# Patient Record
Sex: MALE | Race: WHITE | NOT HISPANIC OR LATINO | Employment: FULL TIME | URBAN - METROPOLITAN AREA
[De-identification: names, ages, dates, MRNs, and addresses within clinical notes are randomized per-mention and may not be internally consistent; named-entity substitution may affect disease eponyms.]

---

## 2017-06-06 ENCOUNTER — ALLSCRIPTS OFFICE VISIT (OUTPATIENT)
Dept: OTHER | Facility: OTHER | Age: 32
End: 2017-06-06

## 2018-01-12 VITALS — DIASTOLIC BLOOD PRESSURE: 70 MMHG | TEMPERATURE: 98.1 F | WEIGHT: 157 LBS | SYSTOLIC BLOOD PRESSURE: 110 MMHG

## 2018-06-07 ENCOUNTER — OFFICE VISIT (OUTPATIENT)
Dept: FAMILY MEDICINE CLINIC | Facility: HOSPITAL | Age: 33
End: 2018-06-07
Payer: COMMERCIAL

## 2018-06-07 DIAGNOSIS — Z88.9 MULTIPLE ALLERGIES: Primary | ICD-10-CM

## 2018-06-07 PROBLEM — H66.90 OTITIS MEDIA, ACUTE: Status: ACTIVE | Noted: 2017-06-06

## 2018-06-07 PROBLEM — H61.23 IMPACTED CERUMEN OF BOTH EARS: Status: ACTIVE | Noted: 2017-06-06

## 2018-06-07 PROCEDURE — 99213 OFFICE O/P EST LOW 20 MIN: CPT | Performed by: PHYSICIAN ASSISTANT

## 2018-06-07 RX ORDER — FLUTICASONE PROPIONATE 50 MCG
2 SPRAY, SUSPENSION (ML) NASAL DAILY
Qty: 16 G | Refills: 3 | Status: SHIPPED | OUTPATIENT
Start: 2018-06-07 | End: 2020-09-30 | Stop reason: ALTCHOICE

## 2018-06-07 NOTE — PATIENT INSTRUCTIONS
Patient to start Zyrtec daily for 4 weeks, and Fluticasone NS for 2 weeks  Recommend otc Debrox regularly  RTO in 2 weeks for ear flushing    Recommend complete, routine blood test

## 2018-06-07 NOTE — PROGRESS NOTES
Assessment/Plan:         Diagnoses and all orders for this visit:    Multiple allergies  -     fluticasone (FLONASE) 50 mcg/act nasal spray; 2 sprays into each nostril daily Use for 2 weeks straight, then prn  Avoid septum  Impacted cerumen  Subjective:      Patient ID: Chantel Matson is a 34 y o  male  34year old white male presents with c/o something in throat, swelling? Lump/pressure  In throat;   past 2-3 months  Notices sx  After drinking coffee or eating certain foods  Admits to binge drinking, 4-6 alcoholic (beers 5%),   drinks on Saturdays, usually, sometimes shots  Denies smoking or using drugs  Denies taking any medications, or supplements  Admits to drinking 2-3 cups of caffeine daily  Admits to being anxious to be in hospital/doctor's office  Denies having GERD sx  No hx  Of smocking, or used drugs  Mother used to smoke, until patient was 17, most of friends smoke  Review of Systems   Constitutional: Negative for chills, diaphoresis, fatigue and fever  HENT: Negative for sore throat and trouble swallowing  Respiratory: Negative for cough, choking and shortness of breath  Gastrointestinal: Negative for abdominal pain, constipation, diarrhea, nausea and vomiting  Neurological: Negative for dizziness, light-headedness, numbness and headaches  Objective: There were no vitals taken for this visit  Physical Exam   Constitutional: He is oriented to person, place, and time  He appears well-developed and well-nourished  No distress  HENT:   Head: Normocephalic and atraumatic  Mouth/Throat: Oropharynx is clear and moist  No oropharyngeal exudate  Unable to see TM due to cerumen, and Turbinates inflamed, left side kissing  Eyes: Conjunctivae and EOM are normal  Right eye exhibits no discharge  Left eye exhibits no discharge  No scleral icterus  Cardiovascular: Normal rate, regular rhythm and normal heart sounds      Pulmonary/Chest: Effort normal and breath sounds normal  No respiratory distress  He has no wheezes  He has no rales  Musculoskeletal: Normal range of motion  He exhibits no edema  Neurological: He is alert and oriented to person, place, and time  Skin: He is not diaphoretic  Nursing note and vitals reviewed

## 2018-06-20 ENCOUNTER — OFFICE VISIT (OUTPATIENT)
Dept: FAMILY MEDICINE CLINIC | Facility: HOSPITAL | Age: 33
End: 2018-06-20
Payer: COMMERCIAL

## 2018-06-20 VITALS
BODY MASS INDEX: 23.7 KG/M2 | WEIGHT: 160 LBS | OXYGEN SATURATION: 98 % | SYSTOLIC BLOOD PRESSURE: 124 MMHG | HEART RATE: 92 BPM | HEIGHT: 69 IN | DIASTOLIC BLOOD PRESSURE: 80 MMHG

## 2018-06-20 DIAGNOSIS — Z88.9 MULTIPLE ALLERGIES: ICD-10-CM

## 2018-06-20 DIAGNOSIS — H61.23 IMPACTED CERUMEN OF BOTH EARS: Primary | ICD-10-CM

## 2018-06-20 PROCEDURE — 3008F BODY MASS INDEX DOCD: CPT | Performed by: PHYSICIAN ASSISTANT

## 2018-06-20 PROCEDURE — 69209 REMOVE IMPACTED EAR WAX UNI: CPT | Performed by: PHYSICIAN ASSISTANT

## 2018-06-20 PROCEDURE — 1036F TOBACCO NON-USER: CPT | Performed by: PHYSICIAN ASSISTANT

## 2018-06-20 PROCEDURE — 99213 OFFICE O/P EST LOW 20 MIN: CPT | Performed by: PHYSICIAN ASSISTANT

## 2018-11-13 NOTE — PROGRESS NOTES
Assessment/Plan:         Diagnoses and all orders for this visit:    Impacted cerumen of both ears  -     Ear cerumen removal; Future    Multiple allergies        Subjective:      Patient ID: Ami Speaker is a 34 y o  male  34year old white male presents for follow up and to have ears flushed  Did not get Fluticasone NS, pharmacy gave patient Claritin D instead? ??    Patient doing well, has been using debrox otc for past few weeks, daily  No ear pain, and no hearing loss  Review of Systems   Constitutional: Negative for chills, diaphoresis, fatigue and fever  HENT: Negative for congestion, ear pain, rhinorrhea and sore throat  Respiratory: Negative for cough and shortness of breath  Objective:      /80   Pulse 92   Ht 5' 9" (1 753 m)   Wt 72 6 kg (160 lb)   SpO2 98%   BMI 23 63 kg/m²          Physical Exam   Constitutional: He is oriented to person, place, and time  He appears well-developed and well-nourished  No distress  HENT:   Head: Normocephalic and atraumatic  Unable to see TM due to cerumen, were viewed after ear flushing in the office, and appear intact/clear  Eyes: Conjunctivae and EOM are normal  Left eye exhibits no discharge  No scleral icterus  Pulmonary/Chest: Effort normal and breath sounds normal    Neurological: He is alert and oriented to person, place, and time  Skin: He is not diaphoretic  Nursing note and vitals reviewed 
No

## 2018-12-20 ENCOUNTER — TELEPHONE (OUTPATIENT)
Dept: FAMILY MEDICINE CLINIC | Facility: HOSPITAL | Age: 33
End: 2018-12-20

## 2018-12-21 DIAGNOSIS — Z20.2 EXPOSURE TO STD: Primary | ICD-10-CM

## 2019-01-10 LAB
HAV IGM SERPL QL IA: NEGATIVE
HBV CORE IGM SERPL QL IA: NEGATIVE
HBV SURFACE AG SERPL QL IA: NEGATIVE
HCV AB S/CO SERPL IA: <0.1 S/CO RATIO (ref 0–0.9)
HGB FRACT BLD-IMP: NEGATIVE
HIV1 AB SERPL QL IA: NEGATIVE
RPR SER QL: NON REACTIVE
SL AMB FINAL INTERPRETATION: NORMAL
SL AMB HIV 1 RNA QUALITATIVE: NEGATIVE
SL AMB HIV 2 AB: NEGATIVE

## 2019-01-14 ENCOUNTER — TELEPHONE (OUTPATIENT)
Dept: FAMILY MEDICINE CLINIC | Facility: HOSPITAL | Age: 34
End: 2019-01-14

## 2019-02-01 ENCOUNTER — TELEPHONE (OUTPATIENT)
Dept: FAMILY MEDICINE CLINIC | Facility: HOSPITAL | Age: 34
End: 2019-02-01

## 2019-02-01 NOTE — TELEPHONE ENCOUNTER
Pt called  Received bill from [facility] for two services on 1/22/19  Inv [id number]  He said his ins called us and told us what tests to order and that we ordered the wrong tests  Told pt I would speak with Brian Murrieta and call Quinton Almeidaemiah and his ins and then call him back when I had all the information and hopefully have this resolved

## 2019-02-05 NOTE — TELEPHONE ENCOUNTER
Pt called back shortly after our conversation and said to forget about the bill  He was just going to pay it

## 2019-04-16 ENCOUNTER — APPOINTMENT (OUTPATIENT)
Dept: URGENT CARE | Facility: CLINIC | Age: 34
End: 2019-04-16
Payer: OTHER MISCELLANEOUS

## 2019-04-16 PROCEDURE — G0382 LEV 3 HOSP TYPE B ED VISIT: HCPCS

## 2019-04-16 PROCEDURE — 99283 EMERGENCY DEPT VISIT LOW MDM: CPT

## 2020-09-30 ENCOUNTER — OFFICE VISIT (OUTPATIENT)
Dept: FAMILY MEDICINE CLINIC | Facility: HOSPITAL | Age: 35
End: 2020-09-30

## 2020-09-30 VITALS
DIASTOLIC BLOOD PRESSURE: 78 MMHG | TEMPERATURE: 98.7 F | HEIGHT: 67 IN | SYSTOLIC BLOOD PRESSURE: 140 MMHG | RESPIRATION RATE: 16 BRPM | WEIGHT: 157 LBS | BODY MASS INDEX: 24.64 KG/M2 | HEART RATE: 84 BPM

## 2020-09-30 DIAGNOSIS — R23.8 PAPULE OF SKIN: Primary | ICD-10-CM

## 2020-09-30 PROCEDURE — 99212 OFFICE O/P EST SF 10 MIN: CPT | Performed by: INTERNAL MEDICINE

## 2020-09-30 NOTE — PROGRESS NOTES
Assessment/Plan:    No problem-specific Assessment & Plan notes found for this encounter  Diagnoses and all orders for this visit:    Papule of skin          Suspect healing folliculitis  Doubt skin cancer, papilloma, herpes genitalis  Has patient to call if skin lesion enlarges, ulcer rates, bleeds or starts draining  Subjective:      Patient ID: Anson Harmon is a 37 y o  male  Patient noticed a palpable of the skin of the groin yesterday when he was taking a shower  Papule has not been draining, ulcerating  He denies any fevers, chills, night sweats, penile discharge, penile ulcers, dysuria, hematuria  The following portions of the patient's history were reviewed and updated as appropriate: current medications, past family history, past medical history, past social history, past surgical history and problem list     Review of Systems   Constitutional: Negative for chills and fever  Genitourinary: Negative for dysuria, hematuria, scrotal swelling, testicular pain and urgency  Skin: Positive for rash  Objective:    /78   Pulse 84   Temp 98 7 °F (37 1 °C) (Tympanic)   Resp 16   Ht 5' 7" (1 702 m)   Wt 71 2 kg (157 lb)   BMI 24 59 kg/m²      Physical Exam  HENT:      Head: Normocephalic  Genitourinary:     Penis: Normal        Scrotum/Testes: Normal       Comments: Patient is circumcised, there was no penile discharge, there were no penile ulcers or growths, there were no testicular lumps or masses, I felt no scrotal masses  Patient had no inguinal lymphadenopathy  Skin:     General: Skin is warm and dry  Comments: Slightly erythematous 2 papules close together were seen on the skin of the groin  Without ulcers, without drainage, without warmth or edema  Neurological:      Mental Status: He is alert and oriented to person, place, and time               King Duncan MD

## 2020-09-30 NOTE — PATIENT INSTRUCTIONS
Call if the lumps on your skin enlarge, ulcers develop on the surface, the lump started draining or bleeding!

## 2020-11-10 ENCOUNTER — OFFICE VISIT (OUTPATIENT)
Dept: FAMILY MEDICINE CLINIC | Facility: HOSPITAL | Age: 35
End: 2020-11-10

## 2020-11-10 VITALS
TEMPERATURE: 97.2 F | WEIGHT: 149 LBS | HEART RATE: 96 BPM | BODY MASS INDEX: 23.39 KG/M2 | DIASTOLIC BLOOD PRESSURE: 72 MMHG | HEIGHT: 67 IN | OXYGEN SATURATION: 97 % | SYSTOLIC BLOOD PRESSURE: 142 MMHG

## 2020-11-10 DIAGNOSIS — K59.09 OTHER CONSTIPATION: Primary | ICD-10-CM

## 2020-11-10 PROCEDURE — 99212 OFFICE O/P EST SF 10 MIN: CPT | Performed by: INTERNAL MEDICINE

## 2020-11-25 ENCOUNTER — TELEPHONE (OUTPATIENT)
Dept: FAMILY MEDICINE CLINIC | Facility: HOSPITAL | Age: 35
End: 2020-11-25

## 2020-11-27 DIAGNOSIS — R53.83 FATIGUE, UNSPECIFIED TYPE: ICD-10-CM

## 2020-11-27 DIAGNOSIS — K59.09 OTHER CONSTIPATION: Primary | ICD-10-CM

## 2021-02-12 ENCOUNTER — TELEPHONE (OUTPATIENT)
Dept: FAMILY MEDICINE CLINIC | Facility: HOSPITAL | Age: 36
End: 2021-02-12

## 2021-02-12 DIAGNOSIS — R19.4 CHANGE IN BOWEL HABITS: Primary | ICD-10-CM

## 2021-02-19 DIAGNOSIS — K59.09 OTHER CONSTIPATION: Primary | ICD-10-CM

## 2021-02-19 DIAGNOSIS — K62.5 RECTAL BLEEDING: ICD-10-CM

## 2021-02-22 ENCOUNTER — OFFICE VISIT (OUTPATIENT)
Dept: FAMILY MEDICINE CLINIC | Facility: HOSPITAL | Age: 36
End: 2021-02-22
Payer: COMMERCIAL

## 2021-02-22 VITALS
WEIGHT: 152 LBS | BODY MASS INDEX: 23.86 KG/M2 | HEIGHT: 67 IN | SYSTOLIC BLOOD PRESSURE: 120 MMHG | RESPIRATION RATE: 16 BRPM | HEART RATE: 69 BPM | DIASTOLIC BLOOD PRESSURE: 82 MMHG

## 2021-02-22 DIAGNOSIS — R19.4 CHANGE IN BOWEL HABITS: ICD-10-CM

## 2021-02-22 DIAGNOSIS — R53.83 FATIGUE, UNSPECIFIED TYPE: ICD-10-CM

## 2021-02-22 DIAGNOSIS — Z00.00 HEALTH CARE MAINTENANCE: Primary | ICD-10-CM

## 2021-02-22 DIAGNOSIS — K59.09 OTHER CONSTIPATION: ICD-10-CM

## 2021-02-22 PROCEDURE — 99395 PREV VISIT EST AGE 18-39: CPT | Performed by: PHYSICIAN ASSISTANT

## 2021-02-22 PROCEDURE — 1036F TOBACCO NON-USER: CPT | Performed by: PHYSICIAN ASSISTANT

## 2021-02-22 NOTE — PROGRESS NOTES
Assessment/Plan:    Health care maintenance       Problem List Items Addressed This Visit        Other    Other constipation - Primary    Relevant Orders    Comprehensive metabolic panel    CBC and differential    Lipid panel    TSH, 3rd generation with Free T4 reflex      Other Visit Diagnoses     Change in bowel habits        Relevant Orders    Comprehensive metabolic panel    Lipid panel    TSH, 3rd generation with Free T4 reflex    Fatigue, unspecified type        Relevant Orders    Comprehensive metabolic panel    CBC and differential    Lipid panel    TSH, 3rd generation with Free T4 reflex    Health care maintenance        Relevant Orders    Comprehensive metabolic panel    CBC and differential    Lipid panel    TSH, 3rd generation with Free T4 reflex            Subjective:      Patient ID: Dinesh Nguyen is a 31 y o  male  Presents for physical      Noticed constipation, and gas build up last year, October, which lasted few weeks  Then noticed a change in bowel habits, sometimes has 3-4 bowel movements a day, but no diarrhea, stool is mostly soft  Use to go once every day or 2  Plans to see GI  Review of Systems   Constitutional: Negative for appetite change, chills, diaphoresis, fatigue and fever  HENT: Negative for congestion, ear pain, rhinorrhea and sore throat  Eyes: Negative for pain, discharge and visual disturbance  Wears glasses-  Last exam- 3-4 years ago, about 2016  Respiratory: Negative for cough, chest tightness and shortness of breath  Gastrointestinal: Negative for abdominal pain, anal bleeding, blood in stool, constipation, diarrhea, nausea and vomiting  Endocrine: Negative for polydipsia, polyphagia and polyuria  Genitourinary: Negative for discharge, dysuria, frequency, penile pain, penile swelling, scrotal swelling and testicular pain  Musculoskeletal: Negative for arthralgias, back pain, myalgias, neck pain and neck stiffness     Neurological: Negative for dizziness, tremors, weakness, light-headedness, numbness and headaches  Psychiatric/Behavioral: Negative for dysphoric mood, self-injury, sleep disturbance and suicidal ideas  The patient is not nervous/anxious  Objective:      /82   Pulse 69   Resp 16   Ht 5' 7" (1 702 m)   Wt 68 9 kg (152 lb)   BMI 23 81 kg/m²          Physical Exam  Vitals signs and nursing note reviewed  Constitutional:       General: He is not in acute distress  Appearance: Normal appearance  He is not ill-appearing, toxic-appearing or diaphoretic  HENT:      Head: Normocephalic and atraumatic  Right Ear: Tympanic membrane, ear canal and external ear normal       Left Ear: There is impacted cerumen  Nose: Nose normal  No congestion or rhinorrhea  Eyes:      General: No scleral icterus  Right eye: No discharge  Left eye: No discharge  Extraocular Movements: Extraocular movements intact  Conjunctiva/sclera: Conjunctivae normal    Neck:      Musculoskeletal: Neck supple  Cardiovascular:      Rate and Rhythm: Normal rate and regular rhythm  Pulses: Normal pulses  Heart sounds: Normal heart sounds  No murmur  No friction rub  No gallop  Pulmonary:      Effort: Pulmonary effort is normal  No respiratory distress  Breath sounds: Normal breath sounds  No stridor  No wheezing, rhonchi or rales  Chest:      Chest wall: No tenderness  Abdominal:      General: Abdomen is flat  There is no distension  Palpations: There is no mass  Tenderness: There is no abdominal tenderness  There is no right CVA tenderness, left CVA tenderness, guarding or rebound  Hernia: No hernia is present  Genitourinary:     Penis: Normal        Scrotum/Testes: Normal    Musculoskeletal: Normal range of motion  General: No swelling, tenderness, deformity or signs of injury  Right lower leg: No edema  Left lower leg: No edema     Skin:     General: Skin is warm and dry  Neurological:      General: No focal deficit present  Mental Status: He is alert and oriented to person, place, and time  Cranial Nerves: No cranial nerve deficit  Sensory: No sensory deficit  Motor: No weakness  Coordination: Coordination normal       Gait: Gait normal    Psychiatric:         Mood and Affect: Mood normal          Thought Content: Thought content normal          Judgment: Judgment normal       Comments: Appears somewhat on edge, somewhat anxious

## 2021-02-22 NOTE — PATIENT INSTRUCTIONS
Urged patient to get complete, fasting blood test    Follow up with GI about change in bowel habits  Discussed meal planning, recommend plenty of water and fiber in diet

## 2021-02-23 ENCOUNTER — OFFICE VISIT (OUTPATIENT)
Dept: GASTROENTEROLOGY | Facility: CLINIC | Age: 36
End: 2021-02-23
Payer: COMMERCIAL

## 2021-02-23 VITALS
SYSTOLIC BLOOD PRESSURE: 122 MMHG | WEIGHT: 153 LBS | BODY MASS INDEX: 24.01 KG/M2 | HEART RATE: 69 BPM | DIASTOLIC BLOOD PRESSURE: 80 MMHG | HEIGHT: 67 IN

## 2021-02-23 DIAGNOSIS — R19.4 CHANGE IN BOWEL HABITS: Primary | ICD-10-CM

## 2021-02-23 PROCEDURE — 99203 OFFICE O/P NEW LOW 30 MIN: CPT | Performed by: INTERNAL MEDICINE

## 2021-02-23 PROCEDURE — 3008F BODY MASS INDEX DOCD: CPT | Performed by: PHYSICIAN ASSISTANT

## 2021-02-23 NOTE — PROGRESS NOTES
[facility] - Outpatient Consultation  Brittany Cowart 31 y o  male MRN: [id number]  Encounter: [id number]          ASSESSMENT AND PLAN:      1  Change in bowel habits    Probably some component of irritable bowel syndrome  Probably related to his return to the work force with a change in diet or change in stress level  Seems to be returning back to his baseline of 1 bowel movement every other day  High-fiber diet seem to make it worse  -   Start fiber supplementation daily with either fiber pills or gummy fibers  -  Get blood work and stool guaiac ordered by PCP done  -   Would not put him through an extensive evaluation (colonoscopy) a less some abnormality in the blood work or stool test  -  Follow-up office visit 4 months    ______________________________________________________________________    HPI:   The patient is seen in the office today per the request of her PCP for evaluation of a change in bowel habits  He reports that his usual bowel movement pattern is about 1 is every other day  In the fall he began having multiple loose stools  He reports initially having 3 soft bowel movements daily  He reports passing some mucus as well  He denies any rectal bleeding  He denies any abdominal pain  He denies any upper GI symptoms  He denies any change in his diet or medications  He reports that he started  Working again around this time  He denies any increase in his stress  Over the last several months his bowels are slowly returning to normal   He is moving his bowels about once a day now  His weight overall has been stable  He saw his PCP who ordered blood work and guaiac stool test and told him he should see GI for evaluation      REVIEW OF SYSTEMS:    CONSTITUTIONAL: Denies any fever, chills, rigors, and weight loss  HEENT: No earache or tinnitus  Denies hearing loss or visual disturbances  CARDIOVASCULAR: No chest pain or palpitations     RESPIRATORY: Denies any cough, hemoptysis, shortness of breath or dyspnea on exertion  GASTROINTESTINAL: As noted in the History of Present Illness  GENITOURINARY: No problems with urination  Denies any hematuria or dysuria  NEUROLOGIC: No dizziness or vertigo, denies headaches  MUSCULOSKELETAL: Denies any muscle or joint pain  SKIN: Denies skin rashes or itching  ENDOCRINE: Denies excessive thirst  Denies intolerance to heat or cold  PSYCHOSOCIAL: Denies depression or anxiety  Denies any recent memory loss  Historical Information   Past Medical History:   Diagnosis Date    Allergic     Allergic rhinitis      History reviewed  No pertinent surgical history  Social History   Social History     Substance and Sexual Activity   Alcohol Use Yes    Comment: occasionally     Social History     Substance and Sexual Activity   Drug Use No     Social History     Tobacco Use   Smoking Status Never Smoker   Smokeless Tobacco Never Used     Family History   Problem Relation Age of Onset    Diabetes Mother     Thyroid disease Mother     Allergies Mother     Substance Abuse Neg Hx     Mental illness Neg Hx     Colon cancer Neg Hx     Colon polyps Neg Hx        Meds/Allergies     No current outpatient medications on file  Allergies   Allergen Reactions    Nickel            Objective     Blood pressure 122/80, pulse 69, height 5' 7" (1 702 m), weight 69 4 kg (153 lb)  Body mass index is 23 96 kg/m²  PHYSICAL EXAM:      General Appearance:   Alert, cooperative, no distress   HEENT:   Normocephalic, atraumatic, anicteric      Neck:  Supple, symmetrical, trachea midline   Lungs:   Clear to auscultation bilaterally; no rales, rhonchi or wheezing; respirations unlabored    Heart[de-identified]   Regular rate and rhythm; no murmur, rub, or gallop     Abdomen:   Soft, non-tender, non-distended; normal bowel sounds; no masses, no organomegaly    Genitalia:   Deferred    Rectal:   Deferred    Extremities:  No cyanosis, clubbing or edema  Pulses:  2+ and symmetric    Skin:  No jaundice, rashes, or lesions    Lymph nodes:  No palpable cervical lymphadenopathy        Lab Results:   No visits with results within 1 Day(s) from this visit  Latest known visit with results is:   Orders Only on 12/21/2018   Component Date Value    HIV-1 Antibody 01/07/2019 Negative     HIV 2 Ab 01/07/2019 Negative     Interpretation 01/07/2019 Negative     RPR 01/07/2019 Non Reactive     Hep A Ab, IgM 01/07/2019 Negative     HBsAg Screen 01/07/2019 Negative     Hep B Core Ab, IgM 01/07/2019 Negative     HEP C AB 01/07/2019 <0 1     HIV 1 RNA Qualitative 01/07/2019 Negative     Final Interpretation 01/07/2019 Comment          Radiology Results:   No results found

## 2021-02-23 NOTE — LETTER
February 23, 2021     EULA Silvestre  [address] [address]    Patient: Danyelle Mcrae   YOB: 1985   Date of Visit: 2/23/2021       Dear Dr Brisa Fagan: Thank you for referring Danyelle Mcrae to me for evaluation  Below are my notes for this consultation  If you have questions, please do not hesitate to call me  I look forward to following your patient along with you  Sincerely,        Jcarlos Bruno MD        CC: No Recipients  Jcarlos Bruno MD  2/23/2021  5:06 PM  Sign when Signing Visit  [facility] - Outpatient Consultation  Danyelle Mcrae 31 y o  male MRN: [id number]  Encounter: [id number]          ASSESSMENT AND PLAN:      1  Change in bowel habits    Probably some component of irritable bowel syndrome  Probably related to his return to the work force with a change in diet or change in stress level  Seems to be returning back to his baseline of 1 bowel movement every other day  High-fiber diet seem to make it worse  -   Start fiber supplementation daily with either fiber pills or gummy fibers  -  Get blood work and stool guaiac ordered by PCP done  -   Would not put him through an extensive evaluation (colonoscopy) a less some abnormality in the blood work or stool test  -  Follow-up office visit 4 months    ______________________________________________________________________    HPI:   The patient is seen in the office today per the request of her PCP for evaluation of a change in bowel habits  He reports that his usual bowel movement pattern is about 1 is every other day  In the fall he began having multiple loose stools  He reports initially having 3 soft bowel movements daily  He reports passing some mucus as well  He denies any rectal bleeding  He denies any abdominal pain  He denies any upper GI symptoms  He denies any change in his diet or medications    He reports that he started  Working again around this time  He denies any increase in his stress  Over the last several months his bowels are slowly returning to normal   He is moving his bowels about once a day now  His weight overall has been stable  He saw his PCP who ordered blood work and guaiac stool test and told him he should see GI for evaluation      REVIEW OF SYSTEMS:    CONSTITUTIONAL: Denies any fever, chills, rigors, and weight loss  HEENT: No earache or tinnitus  Denies hearing loss or visual disturbances  CARDIOVASCULAR: No chest pain or palpitations  RESPIRATORY: Denies any cough, hemoptysis, shortness of breath or dyspnea on exertion  GASTROINTESTINAL: As noted in the History of Present Illness  GENITOURINARY: No problems with urination  Denies any hematuria or dysuria  NEUROLOGIC: No dizziness or vertigo, denies headaches  MUSCULOSKELETAL: Denies any muscle or joint pain  SKIN: Denies skin rashes or itching  ENDOCRINE: Denies excessive thirst  Denies intolerance to heat or cold  PSYCHOSOCIAL: Denies depression or anxiety  Denies any recent memory loss  Historical Information   Past Medical History:   Diagnosis Date    Allergic     Allergic rhinitis      History reviewed  No pertinent surgical history  Social History   Social History     Substance and Sexual Activity   Alcohol Use Yes    Comment: occasionally     Social History     Substance and Sexual Activity   Drug Use No     Social History     Tobacco Use   Smoking Status Never Smoker   Smokeless Tobacco Never Used     Family History   Problem Relation Age of Onset    Diabetes Mother     Thyroid disease Mother     Allergies Mother     Substance Abuse Neg Hx     Mental illness Neg Hx     Colon cancer Neg Hx     Colon polyps Neg Hx        Meds/Allergies     No current outpatient medications on file  Allergies   Allergen Reactions    Nickel            Objective     Blood pressure 122/80, pulse 69, height 5' 7" (1 702 m), weight 69 4 kg (153 lb)  Body mass index is 23 96 kg/m²  PHYSICAL EXAM:      General Appearance:   Alert, cooperative, no distress   HEENT:   Normocephalic, atraumatic, anicteric      Neck:  Supple, symmetrical, trachea midline   Lungs:   Clear to auscultation bilaterally; no rales, rhonchi or wheezing; respirations unlabored    Heart[de-identified]   Regular rate and rhythm; no murmur, rub, or gallop  Abdomen:   Soft, non-tender, non-distended; normal bowel sounds; no masses, no organomegaly    Genitalia:   Deferred    Rectal:   Deferred    Extremities:  No cyanosis, clubbing or edema    Pulses:  2+ and symmetric    Skin:  No jaundice, rashes, or lesions    Lymph nodes:  No palpable cervical lymphadenopathy        Lab Results:   No visits with results within 1 Day(s) from this visit  Latest known visit with results is:   Orders Only on 12/21/2018   Component Date Value    HIV-1 Antibody 01/07/2019 Negative     HIV 2 Ab 01/07/2019 Negative     Interpretation 01/07/2019 Negative     RPR 01/07/2019 Non Reactive     Hep A Ab, IgM 01/07/2019 Negative     HBsAg Screen 01/07/2019 Negative     Hep B Core Ab, IgM 01/07/2019 Negative     HEP C AB 01/07/2019 <0 1     HIV 1 RNA Qualitative 01/07/2019 Negative     Final Interpretation 01/07/2019 Comment          Radiology Results:   No results found

## 2021-02-23 NOTE — PATIENT INSTRUCTIONS
Start fiber supplementation daily  Probably best use fiber pill or gummy fiber    Get blood work and stool guaiac test done

## 2021-03-12 ENCOUNTER — TELEPHONE (OUTPATIENT)
Dept: FAMILY MEDICINE CLINIC | Facility: HOSPITAL | Age: 36
End: 2021-03-12

## 2021-11-01 ENCOUNTER — OFFICE VISIT (OUTPATIENT)
Dept: FAMILY MEDICINE CLINIC | Facility: HOSPITAL | Age: 36
End: 2021-11-01
Payer: COMMERCIAL

## 2021-11-01 VITALS
WEIGHT: 158 LBS | DIASTOLIC BLOOD PRESSURE: 80 MMHG | TEMPERATURE: 99 F | SYSTOLIC BLOOD PRESSURE: 120 MMHG | BODY MASS INDEX: 24.75 KG/M2

## 2021-11-01 DIAGNOSIS — Z88.9 H/O SEASONAL ALLERGIES: Primary | ICD-10-CM

## 2021-11-01 PROCEDURE — 99212 OFFICE O/P EST SF 10 MIN: CPT | Performed by: PHYSICIAN ASSISTANT

## 2021-11-01 RX ORDER — OLOPATADINE HYDROCHLORIDE 1 MG/ML
1 SOLUTION/ DROPS OPHTHALMIC 2 TIMES DAILY
Qty: 5 ML | Refills: 3 | Status: SHIPPED | OUTPATIENT
Start: 2021-11-01

## 2022-03-07 ENCOUNTER — OFFICE VISIT (OUTPATIENT)
Dept: FAMILY MEDICINE CLINIC | Facility: HOSPITAL | Age: 37
End: 2022-03-07
Payer: COMMERCIAL

## 2022-03-07 VITALS
OXYGEN SATURATION: 98 % | HEART RATE: 72 BPM | HEIGHT: 68 IN | DIASTOLIC BLOOD PRESSURE: 76 MMHG | BODY MASS INDEX: 25.28 KG/M2 | WEIGHT: 166.8 LBS | SYSTOLIC BLOOD PRESSURE: 118 MMHG | RESPIRATION RATE: 16 BRPM

## 2022-03-07 DIAGNOSIS — Z00.00 ANNUAL PHYSICAL EXAM: Primary | ICD-10-CM

## 2022-03-07 PROBLEM — K59.09 OTHER CONSTIPATION: Status: RESOLVED | Noted: 2020-11-10 | Resolved: 2022-03-07

## 2022-03-07 PROBLEM — H66.90 OTITIS MEDIA, ACUTE: Status: RESOLVED | Noted: 2017-06-06 | Resolved: 2022-03-07

## 2022-03-07 PROBLEM — J30.1 SEASONAL ALLERGIC RHINITIS DUE TO POLLEN: Status: ACTIVE | Noted: 2022-03-07

## 2022-03-07 PROCEDURE — 99395 PREV VISIT EST AGE 18-39: CPT | Performed by: INTERNAL MEDICINE

## 2022-03-07 NOTE — PATIENT INSTRUCTIONS

## 2022-03-07 NOTE — PROGRESS NOTES
[facility] [address]    NAME: Quinn Easton  AGE: 30 y o  SEX: male  : 1985     DATE: 3/7/2022     Assessment and Plan:     Problem List Items Addressed This Visit     None      Visit Diagnoses     Annual physical exam    -  Primary          Immunizations and preventive care screenings were discussed with patient today  Appropriate education was printed on patient's after visit summary  Counseling:  Exercise: the importance of regular exercise/physical activity was discussed  Recommend exercise 3-5 times per week for at least 30 minutes  · Advised patient to have a fasting blood sugar and cholesterol checked  Patient declined today  Will consider next year      Depression Screening and Follow-up Plan: Patient was screened for depression during today's encounter  They screened negative with a PHQ-2 score of 0  No follow-ups on file  Chief Complaint:     No chief complaint on file  History of Present Illness:     Adult Annual Physical   Patient here for a comprehensive physical exam  The patient reports no problems  Diet and Physical Activity  · Diet/Nutrition: well balanced diet  · Exercise: moderate cardiovascular exercise  Depression Screening  PHQ-2/9 Depression Screening    Little interest or pleasure in doing things: 0 - not at all  Feeling down, depressed, or hopeless: 0 - not at all  PHQ-2 Score: 0  PHQ-2 Interpretation: Negative depression screen       General Health  · Sleep: sleeps well  · Hearing: normal - bilateral   · Vision: wears glasses  · Dental: regular dental visits  [facility]  · History of STDs?: no      Review of Systems:     Review of Systems   Constitutional: Negative for fever  HENT: Negative for congestion  Eyes: Negative for visual disturbance  Respiratory: Negative for cough and shortness of breath      Cardiovascular: Negative for chest pain, palpitations and leg swelling  Gastrointestinal: Negative for abdominal pain, blood in stool and diarrhea  Endocrine: Negative for polydipsia  Genitourinary: Negative for difficulty urinating and hematuria  Musculoskeletal: Negative for joint swelling, myalgias and neck stiffness  Skin: Negative for rash  Neurological: Negative for weakness, numbness and headaches  Hematological: Negative for adenopathy  Psychiatric/Behavioral: Negative for dysphoric mood  All other systems reviewed and are negative  Past Medical History:     Past Medical History:   Diagnosis Date    Allergic     Allergic rhinitis       Past Surgical History:     History reviewed  No pertinent surgical history  Social History:     Social History     Socioeconomic History    Marital status: Single     Spouse name: None    Number of children: None    Years of education: None    Highest education level: None   Occupational History    None   Tobacco Use    Smoking status: Never Smoker    Smokeless tobacco: Never Used   Vaping Use    Vaping Use: Never used   Substance and Sexual Activity    Alcohol use: Not Currently    Drug use: No    Sexual activity: None   Other Topics Concern    None   Social History Narrative    Racial background:   White     Wears seatbelt    Exercise habits    Lives with parents    Feels safe at home     Social Determinants of Health     Financial Resource Strain: Not on file   Food Insecurity: Not on file   Transportation Needs: Not on file   Physical Activity: Not on file   Stress: Not on file   Social Connections: Not on file   Intimate Partner Violence: Not on file   Housing Stability: Not on file      Family History:     Family History   Problem Relation Age of Onset    Diabetes Mother     Thyroid disease Mother     Allergies Mother     Substance Abuse Neg Hx     Mental illness Neg Hx     Colon cancer Neg Hx     Colon polyps Neg Hx       Current Medications:     Current Outpatient Medications   Medication Sig Dispense Refill    olopatadine (PATANOL) 0 1 % ophthalmic solution Administer 1 drop to both eyes 2 (two) times a day (Patient not taking: Reported on 3/7/2022 ) 5 mL 3     No current facility-administered medications for this visit  Allergies: Allergies   Allergen Reactions    Nickel       Physical Exam:     /76   Pulse 72   Resp 16   Ht 5' 8" (1 727 m)   Wt 75 7 kg (166 lb 12 8 oz)   SpO2 98%   BMI 25 36 kg/m²     Physical Exam  Constitutional:       Appearance: He is well-developed  HENT:      Head: Normocephalic  Right Ear: There is impacted cerumen  Left Ear: There is impacted cerumen  Eyes:      Conjunctiva/sclera: Conjunctivae normal    Cardiovascular:      Rate and Rhythm: Normal rate and regular rhythm  Heart sounds: No murmur heard  Pulmonary:      Effort: No respiratory distress  Breath sounds: No wheezing or rales  Abdominal:      General: Bowel sounds are normal       Palpations: Abdomen is soft  Tenderness: There is no abdominal tenderness  Musculoskeletal:         General: No tenderness  Cervical back: Neck supple  Lymphadenopathy:      Cervical: No cervical adenopathy  Skin:     General: Skin is warm and dry  Findings: No erythema  Neurological:      Mental Status: He is alert and oriented to person, place, and time  Cranial Nerves: No cranial nerve deficit  Motor: No weakness  Gait: Gait normal    Psychiatric:         Mood and Affect: Mood normal          Thought Content:  Thought content normal          Judgment: Judgment normal           Mally Esparza MD     [facility] [phone]

## 2023-01-30 ENCOUNTER — OFFICE VISIT (OUTPATIENT)
Dept: FAMILY MEDICINE CLINIC | Facility: HOSPITAL | Age: 38
End: 2023-01-30

## 2023-01-30 ENCOUNTER — TELEPHONE (OUTPATIENT)
Dept: FAMILY MEDICINE CLINIC | Facility: HOSPITAL | Age: 38
End: 2023-01-30

## 2023-01-30 VITALS
TEMPERATURE: 96.6 F | BODY MASS INDEX: 24.98 KG/M2 | DIASTOLIC BLOOD PRESSURE: 98 MMHG | WEIGHT: 164.8 LBS | SYSTOLIC BLOOD PRESSURE: 138 MMHG | HEIGHT: 68 IN | HEART RATE: 94 BPM

## 2023-01-30 DIAGNOSIS — J06.9 VIRAL UPPER RESPIRATORY ILLNESS: Primary | ICD-10-CM

## 2023-01-30 NOTE — TELEPHONE ENCOUNTER
Pt c/o severe head congestion, runny nose and very dry thorat  No fever  Symptoms began yesterday  Not currently taking any OTC   PCB

## 2023-01-30 NOTE — PROGRESS NOTES
Name: Fabian Skiff      : 1985      MRN: [id number]  Encounter Provider: Jt Tavarez DO  Encounter Date: 2023   Encounter department: [address] [place] [address]  203     Assessment & Plan     1  Viral upper respiratory illness  Comments:  Reassured pt that his exam was normal and duration of symptoms are within nml for a viral URI, encouraged OTC antihistamine, Flonase and saline nasal spray, encouraged gargles/hot tea/lozenges/rest/fluids, d/w pt that symptoms can persist 10 days - if no better at all after 10 days OR with any significant new/worse symptoms he should give us a call for re-eval           Subjective      HPI Pt here for an acute visit    Pt noting 4 days of worsening congestion  He has chronic allergies but 4 days ago symptoms started to worsen  He notes his chronic congestions is worse and he a lot of PND  He notes no HA's/sinus pain or pressure/ST/F/C/ear pain/N/V/D/swallowing issues/urinary symptoms  He has been using a sinus spray and just started warm saline rinses w/some benefit  He is not on an antihistamine  He notes no sick contacts  He did a COVID test today and it was negative  Review of Systems   Constitutional: Negative for chills and fever  HENT: Positive for postnasal drip and rhinorrhea  Negative for congestion, ear pain, sinus pressure, sinus pain and sore throat  Eyes: Negative for discharge, redness and itching  Respiratory: Negative for cough and shortness of breath  Cardiovascular: Negative for chest pain and palpitations  Musculoskeletal: Negative for arthralgias and myalgias  Skin: Negative for rash and wound  Neurological: Negative for dizziness and headaches         Current Outpatient Medications on File Prior to Visit   Medication Sig   • olopatadine (PATANOL) 0 1 % ophthalmic solution Administer 1 drop to both eyes 2 (two) times a day (Patient not taking: Reported on 3/7/2022 )       Objective     /98   Pulse 94   Temp (!) 96 6 °F (35 9 °C) (Tympanic)   Ht 5' 8" (1 727 m)   Wt 74 8 kg (164 lb 12 8 oz)   BMI 25 06 kg/m²     Physical Exam  Constitutional:       General: He is not in acute distress  Appearance: He is well-developed  He is not ill-appearing  HENT:      Head: Normocephalic and atraumatic  Right Ear: External ear normal       Left Ear: External ear normal       Ears:      Comments: Partial cerumen impaction B/L  Eyes:      General:         Right eye: No discharge  Left eye: No discharge  Conjunctiva/sclera: Conjunctivae normal    Cardiovascular:      Rate and Rhythm: Normal rate and regular rhythm  Heart sounds: No murmur heard  Pulmonary:      Effort: Pulmonary effort is normal  No respiratory distress  Breath sounds: Normal breath sounds  No wheezing, rhonchi or rales  Lymphadenopathy:      Cervical: Cervical adenopathy present  Skin:     General: Skin is warm and dry  Coloration: Skin is not pale  Findings: No rash  Neurological:      General: No focal deficit present  Mental Status: He is alert        Gait: Gait normal    Psychiatric:         Mood and Affect: Mood normal          Behavior: Behavior normal        Venu Shape, DO

## 2023-03-06 ENCOUNTER — OFFICE VISIT (OUTPATIENT)
Dept: FAMILY MEDICINE CLINIC | Facility: HOSPITAL | Age: 38
End: 2023-03-06

## 2023-03-06 VITALS
HEART RATE: 64 BPM | BODY MASS INDEX: 24.25 KG/M2 | SYSTOLIC BLOOD PRESSURE: 132 MMHG | OXYGEN SATURATION: 99 % | RESPIRATION RATE: 16 BRPM | WEIGHT: 160 LBS | HEIGHT: 68 IN | DIASTOLIC BLOOD PRESSURE: 76 MMHG

## 2023-03-06 DIAGNOSIS — Z11.59 NEED FOR HEPATITIS C SCREENING TEST: ICD-10-CM

## 2023-03-06 DIAGNOSIS — Z13.1 SCREENING FOR DIABETES MELLITUS: ICD-10-CM

## 2023-03-06 DIAGNOSIS — Z13.6 SCREENING FOR CARDIOVASCULAR CONDITION: ICD-10-CM

## 2023-03-06 DIAGNOSIS — Z11.4 SCREENING FOR HIV (HUMAN IMMUNODEFICIENCY VIRUS): ICD-10-CM

## 2023-03-06 DIAGNOSIS — Z00.00 ANNUAL PHYSICAL EXAM: Primary | ICD-10-CM

## 2023-03-06 DIAGNOSIS — Z11.3 SCREENING FOR STDS (SEXUALLY TRANSMITTED DISEASES): ICD-10-CM

## 2023-03-06 NOTE — PROGRESS NOTES
[facility] [address]    NAME: Mel Castro  AGE: 41 y o  SEX: male  : 1985     DATE: 3/6/2023     Assessment and Plan:     Problem List Items Addressed This Visit    None  Visit Diagnoses     Annual physical exam    -  Primary    Screening for diabetes mellitus        Relevant Orders    Comprehensive metabolic panel    Screening for cardiovascular condition        Relevant Orders    Lipid panel    Screening for HIV (human immunodeficiency virus)        Relevant Orders    HIV 1/2 AG/AB W REFLEX [facility] and [facility] only    Need for hepatitis C screening test        Relevant Orders    Hepatitis C antibody    Screening for STDs (sexually transmitted diseases)        Relevant Orders    RPR w/reflex to TrepSure          Immunizations and preventive care screenings were discussed with patient today  Appropriate education was printed on patient's after visit summary  Counseling:  · Exercise: the importance of regular exercise/physical activity was discussed  Recommend exercise 3-5 times per week for at least 30 minutes  · Wishes to be screened for STIs         Return in 1 year (on 3/6/2024)  Chief Complaint:     Chief Complaint   Patient presents with   • Annual Exam      History of Present Illness:     Adult Annual Physical   Patient here for a comprehensive physical exam  The patient reports no problems  Diet and Physical Activity  · Diet/Nutrition: well balanced diet  · Exercise: vigorous cardiovascular exercise  Depression Screening  PHQ-2/9 Depression Screening    Little interest or pleasure in doing things: 0 - not at all  Feeling down, depressed, or hopeless: 0 - not at all  PHQ-2 Score: 0  PHQ-2 Interpretation: Negative depression screen       General Health  · Sleep: sleeps well  · Hearing: normal - bilateral   · Vision: wears glasses  · Dental: no dental visits for >1 year          Health  · History of STDs?: no      Review of Systems:     Review of Systems   Constitutional: Negative for fever  HENT: Negative for congestion and hearing loss  Eyes: Negative for visual disturbance  Respiratory: Negative for cough and shortness of breath  Cardiovascular: Negative for chest pain, palpitations and leg swelling  Gastrointestinal: Negative for abdominal pain, blood in stool and diarrhea  Endocrine: Positive for polyphagia  Negative for polydipsia  Genitourinary: Negative for difficulty urinating and hematuria  Musculoskeletal: Negative for myalgias  Skin: Negative for rash  Neurological: Negative for numbness and headaches  Hematological: Negative for adenopathy  Psychiatric/Behavioral: Negative for dysphoric mood  All other systems reviewed and are negative  Past Medical History:     History reviewed  No pertinent past medical history  Past Surgical History:     History reviewed  No pertinent surgical history  Social History:     Social History     Socioeconomic History   • Marital status: Single     Spouse name: None   • Number of children: None   • Years of education: None   • Highest education level: None   Occupational History   • None   Tobacco Use   • Smoking status: Never   • Smokeless tobacco: Never   Vaping Use   • Vaping Use: Never used   Substance and Sexual Activity   • Alcohol use: Not Currently   • Drug use: No   • Sexual activity: None   Other Topics Concern   • None   Social History Narrative    Racial background:   White     Wears seatbelt    Exercise habits    Lives with parents    Feels safe at home     Social Determinants of Health     Financial Resource Strain: Not on file   Food Insecurity: Not on file   Transportation Needs: Not on file   Physical Activity: Not on file   Stress: Not on file   Social Connections: Not on file   Intimate Partner Violence: Not on file   Housing Stability: Not on file      Family History:     Family History   Problem Relation Age of Onset   • Diabetes Mother    • Thyroid disease Mother    • Allergies Mother    • Substance Abuse Neg Hx    • Mental illness Neg Hx    • Colon cancer Neg Hx    • Colon polyps Neg Hx       Current Medications:     Current Outpatient Medications   Medication Sig Dispense Refill   • ARTIFICIAL TEAR SOLUTION OP Apply to eye 1 drop each eye as needed       No current facility-administered medications for this visit  Allergies: Allergies   Allergen Reactions   • Nickel Other (See Comments)     rash      Physical Exam:     /76   Pulse 64   Resp 16   Ht 5' 8" (1 727 m)   Wt 72 6 kg (160 lb)   SpO2 99%   BMI 24 33 kg/m²     Physical Exam  Constitutional:       Appearance: He is well-developed  HENT:      Head: Normocephalic  Right Ear: There is impacted cerumen  Left Ear: There is impacted cerumen  Mouth/Throat:      Mouth: Mucous membranes are moist       Pharynx: No oropharyngeal exudate  Eyes:      Conjunctiva/sclera: Conjunctivae normal    Cardiovascular:      Rate and Rhythm: Normal rate and regular rhythm  Heart sounds: No murmur heard  Pulmonary:      Effort: No respiratory distress  Breath sounds: No wheezing or rales  Abdominal:      General: Bowel sounds are normal       Palpations: Abdomen is soft  Tenderness: There is no abdominal tenderness  Musculoskeletal:         General: No tenderness  Cervical back: Neck supple  Skin:     General: Skin is warm and dry  Findings: No erythema  Neurological:      Mental Status: He is alert and oriented to person, place, and time  Cranial Nerves: No cranial nerve deficit  Motor: No weakness  Gait: Gait normal    Psychiatric:         Mood and Affect: Mood normal          Thought Content:  Thought content normal           Angelica Mattson MD   [facility] [id number]

## 2023-09-15 ENCOUNTER — OFFICE VISIT (OUTPATIENT)
Dept: URGENT CARE | Facility: CLINIC | Age: 38
End: 2023-09-15
Payer: COMMERCIAL

## 2023-09-15 VITALS
HEART RATE: 70 BPM | OXYGEN SATURATION: 99 % | SYSTOLIC BLOOD PRESSURE: 147 MMHG | TEMPERATURE: 98.3 F | RESPIRATION RATE: 16 BRPM | DIASTOLIC BLOOD PRESSURE: 88 MMHG

## 2023-09-15 DIAGNOSIS — S05.52XA FOREIGN BODY IN LENS OF LEFT EYE, INITIAL ENCOUNTER: Primary | ICD-10-CM

## 2023-09-15 PROCEDURE — 99213 OFFICE O/P EST LOW 20 MIN: CPT | Performed by: FAMILY MEDICINE

## 2023-09-15 RX ORDER — OFLOXACIN 3 MG/ML
1 SOLUTION/ DROPS OPHTHALMIC 4 TIMES DAILY
Qty: 5 ML | Refills: 0 | Status: SHIPPED | OUTPATIENT
Start: 2023-09-15 | End: 2023-09-22

## 2023-09-15 NOTE — PROGRESS NOTES
[facility]        NAME: Baljinder Burk is a 41 y.o. male  : 1985    MRN: [id number]  DATE: September 15, 2023  TIME: 6:08 PM    Assessment and Plan   Foreign body in lens of left eye, initial encounter [S05.52XA]  1. Foreign body in lens of left eye, initial encounter  ofloxacin (OCUFLOX) 0.3 % ophthalmic solution            Patient Instructions       Follow up with PCP in 3-5 days. Proceed to  ER if symptoms worsen. Chief Complaint     Chief Complaint   Patient presents with   • Eye Pain     Pt reports he got something in his eye at work and would like it to be checked to see if there's any significant injury. History of Present Illness       41year old male presenting for evaluation of left eye irritation and redness. He states while working with a glass at work earlier today, possibly having a piece of shattered glass going to his eye. He does not note any pain at this time or any visual disturbances just continues to note feelings of dryness and irritation. Review of Systems   Review of Systems   Constitutional: Negative. HENT: Negative. Eyes: Positive for itching. Respiratory: Negative. Cardiovascular: Negative. Gastrointestinal: Negative. Genitourinary: Negative. Skin: Negative. Allergic/Immunologic: Negative. Neurological: Negative. Hematological: Negative. Psychiatric/Behavioral: Negative.           Current Medications       Current Outpatient Medications:   •  ofloxacin (OCUFLOX) 0.3 % ophthalmic solution, Administer 1 drop into the left eye 4 (four) times a day for 7 days, Disp: 5 mL, Rfl: 0  •  ARTIFICIAL TEAR SOLUTION OP, Apply to eye 1 drop each eye as needed, Disp: , Rfl:     Current Allergies     Allergies as of 09/15/2023 - Reviewed 09/15/2023   Allergen Reaction Noted   • Nickel Other (See Comments) 2015            The following portions of the patient's history were reviewed and updated as appropriate: allergies, current medications, past family history, past medical history, past social history, past surgical history and problem list.     History reviewed. No pertinent past medical history. History reviewed. No pertinent surgical history. Family History   Problem Relation Age of Onset   • Diabetes Mother    • Thyroid disease Mother    • Allergies Mother    • Substance Abuse Neg Hx    • Mental illness Neg Hx    • Colon cancer Neg Hx    • Colon polyps Neg Hx          Medications have been verified. Objective   /88   Pulse 70   Temp 98.3 °F (36.8 °C)   Resp 16   SpO2 99%   No LMP for male patient. Physical Exam     Physical Exam  Constitutional:       Appearance: He is well-developed. HENT:      Head: Normocephalic. Eyes:      Pupils: Pupils are equal, round, and reactive to light. Left eye: Fluorescein uptake present. Slit lamp exam:     Left eye: No corneal ulcer or foreign body. Pulmonary:      Effort: Pulmonary effort is normal.   Musculoskeletal:         General: Normal range of motion. Cervical back: Normal range of motion. Skin:     General: Skin is warm. Neurological:      Mental Status: He is alert and oriented to person, place, and time.

## 2024-02-13 ENCOUNTER — APPOINTMENT (OUTPATIENT)
Dept: PHYSICAL THERAPY | Facility: CLINIC | Age: 39
End: 2024-02-13

## 2024-02-13 PROCEDURE — 97530 THERAPEUTIC ACTIVITIES: CPT | Performed by: PHYSICAL THERAPIST

## 2024-02-21 PROBLEM — H61.23 IMPACTED CERUMEN OF BOTH EARS: Status: RESOLVED | Noted: 2017-06-06 | Resolved: 2024-02-21

## 2024-03-27 ENCOUNTER — APPOINTMENT (OUTPATIENT)
Dept: PHYSICAL THERAPY | Facility: CLINIC | Age: 39
End: 2024-03-27

## 2024-03-27 PROCEDURE — 97530 THERAPEUTIC ACTIVITIES: CPT

## 2024-04-02 ENCOUNTER — OFFICE VISIT (OUTPATIENT)
Dept: FAMILY MEDICINE CLINIC | Facility: HOSPITAL | Age: 39
End: 2024-04-02

## 2024-04-02 VITALS
SYSTOLIC BLOOD PRESSURE: 128 MMHG | DIASTOLIC BLOOD PRESSURE: 86 MMHG | BODY MASS INDEX: 25.18 KG/M2 | HEART RATE: 95 BPM | HEIGHT: 69 IN | OXYGEN SATURATION: 97 % | WEIGHT: 170 LBS

## 2024-04-02 DIAGNOSIS — J30.1 SEASONAL ALLERGIC RHINITIS DUE TO POLLEN: ICD-10-CM

## 2024-04-02 DIAGNOSIS — H65.193 ACUTE MUCOID OTITIS MEDIA OF BOTH EARS: Primary | ICD-10-CM

## 2024-04-02 DIAGNOSIS — H61.23 BILATERAL HEARING LOSS DUE TO CERUMEN IMPACTION: ICD-10-CM

## 2024-04-02 PROBLEM — S05.52XA: Status: RESOLVED | Noted: 2023-09-15 | Resolved: 2024-04-02

## 2024-04-02 PROCEDURE — 99213 OFFICE O/P EST LOW 20 MIN: CPT | Performed by: STUDENT IN AN ORGANIZED HEALTH CARE EDUCATION/TRAINING PROGRAM

## 2024-04-02 PROCEDURE — 69209 REMOVE IMPACTED EAR WAX UNI: CPT | Performed by: STUDENT IN AN ORGANIZED HEALTH CARE EDUCATION/TRAINING PROGRAM

## 2024-04-02 RX ORDER — AMOXICILLIN AND CLAVULANATE POTASSIUM 875; 125 MG/1; MG/1
1 TABLET, FILM COATED ORAL EVERY 12 HOURS SCHEDULED
Qty: 20 TABLET | Refills: 0 | Status: SHIPPED | OUTPATIENT
Start: 2024-04-02 | End: 2024-04-12

## 2024-04-02 NOTE — PROGRESS NOTES
[facility]   Ketty Lam DO    Assessment/Plan:      Diagnosis ICD-10-CM Associated Orders   1. Acute mucoid otitis media of both ears  H65.193 amoxicillin-clavulanate (AUGMENTIN) 875-125 mg per tablet      2. Bilateral hearing loss due to cerumen impaction  H61.23 Ear cerumen removal     amoxicillin-clavulanate (AUGMENTIN) 875-125 mg per tablet      3. Seasonal allergic rhinitis due to pollen  J30.1         Ear flush done in office today, well tolerated & augmentin ordered fopr Left ear pain, ringing, redness, pressure.   After acute illness can resume debrox drops as needed, fully clear right now.   Return in about 3 months (around 7/2/2024) for Annual Physical - Dr. Barrera / Possible ear flush if needed.  Patient may call or return to office with any questions or concerns.   ______________________________________________________________________  Subjective:     Patient ID: Estiven Fonseca is a 39 y.o. male.  Estiven Fonseca  Chief Complaint   Patient presents with   • Ear Fullness     Has hx of ear fullness.   Seems it stuck in there.    Debrox drops have helped before, but not entirely now.     Ringing on L side.   Has had them flushed before.     Not sick recently.   Hearing seems okay.        The following portions of the patient's history were reviewed and updated as appropriate: allergies, current medications, past medical history, and problem list.    Review of Systems   Constitutional:  Negative for chills and fever.   HENT:  Positive for congestion (at times.) and tinnitus. Negative for rhinorrhea, sinus pressure and sinus pain.    Eyes:  Negative for pain, redness and itching.   Respiratory:  Negative for cough and shortness of breath.    Cardiovascular:  Negative for chest pain and palpitations.   Neurological:  Positive for headaches (sometimes over past few months, tension Headaches, top). Negative for dizziness and light-headedness.   Psychiatric/Behavioral:  The patient is  "nervous/anxious.        Objective:      Vitals:    04/02/24 1637   BP: 128/86   Pulse: 95   SpO2: 97%      Physical Exam  Vitals reviewed.   Constitutional:       Appearance: Normal appearance. He is well-developed and normal weight.   HENT:      Head: Normocephalic and atraumatic.      Right Ear: External ear normal. There is impacted cerumen.      Left Ear: External ear normal. There is impacted cerumen.      Nose: Nose normal. No congestion.      Mouth/Throat:      Mouth: Mucous membranes are moist.      Pharynx: Oropharynx is clear. No oropharyngeal exudate.   Eyes:      General: No scleral icterus.        Right eye: No discharge.         Left eye: No discharge.   Cardiovascular:      Rate and Rhythm: Normal rate.      Pulses: Normal pulses.   Pulmonary:      Effort: Pulmonary effort is normal. No respiratory distress.      Breath sounds: No stridor.   Musculoskeletal:      Cervical back: Normal range of motion and neck supple. No rigidity or tenderness.   Skin:     General: Skin is warm and dry.   Neurological:      Mental Status: He is alert and oriented to person, place, and time.      Gait: Gait normal.   Psychiatric:         Mood and Affect: Mood normal.         Behavior: Behavior normal.         Thought Content: Thought content normal.         Judgment: Judgment normal.           Ear cerumen removal    Date/Time: 4/2/2024 4:20 PM    Performed by: Ketty Lam DO  Authorized by: Ketty Lam DO  Universal Protocol:  Procedure performed by: (Sylwia BURNETT)  Consent: Verbal consent obtained.  Risks and benefits: risks, benefits and alternatives were discussed  Consent given by: patient  Time out: Immediately prior to procedure a \"time out\" was called to verify the correct patient, procedure, equipment, support staff and site/side marked as required.  Patient understanding: patient states understanding of the procedure being performed  Patient consent: the patient's understanding of the procedure matches " "consent given  Procedure consent: procedure consent matches procedure scheduled  Patient identity confirmed: verbally with patient    Patient location:  Clinic  Indications / Diagnosis:  Cerumen Impaction  Procedure details:     Location:  L ear and R ear    Procedure type: irrigation only      Approach:  External  Post-procedure details:     Complication:  None    Hearing quality:  Improved    Patient tolerance of procedure:  Tolerated well, no immediate complications  Comments:      TM's impacted prior b/l   TM on R slightly mucoid media  TM on L slightly erythematous & mucoid appearing, canal TTP         Portions of the record may have been created with voice recognition software. Occasional wrong word or \"sound alike\" substitutions may have occurred due to the inherent limitations of voice recognition software. Please review the chart carefully and recognize, using context, where substitutions/typographical errors may have occurred.     "